# Patient Record
Sex: FEMALE | Race: BLACK OR AFRICAN AMERICAN | NOT HISPANIC OR LATINO | URBAN - METROPOLITAN AREA
[De-identification: names, ages, dates, MRNs, and addresses within clinical notes are randomized per-mention and may not be internally consistent; named-entity substitution may affect disease eponyms.]

---

## 2023-12-12 ENCOUNTER — OFFICE (OUTPATIENT)
Dept: URBAN - METROPOLITAN AREA CLINIC 67 | Facility: CLINIC | Age: 28
End: 2023-12-12
Payer: COMMERCIAL

## 2023-12-12 VITALS
SYSTOLIC BLOOD PRESSURE: 110 MMHG | WEIGHT: 124 LBS | HEART RATE: 105 BPM | HEIGHT: 68 IN | DIASTOLIC BLOOD PRESSURE: 60 MMHG

## 2023-12-12 DIAGNOSIS — K62.5 HEMORRHAGE OF ANUS AND RECTUM: ICD-10-CM

## 2023-12-12 DIAGNOSIS — K60.2 ANAL FISSURE, UNSPECIFIED: ICD-10-CM

## 2023-12-12 DIAGNOSIS — K62.89 OTHER SPECIFIED DISEASES OF ANUS AND RECTUM: ICD-10-CM

## 2023-12-12 PROCEDURE — 99204 OFFICE O/P NEW MOD 45 MIN: CPT | Performed by: INTERNAL MEDICINE

## 2023-12-12 NOTE — SERVICEHPINOTES
Melanie Dixon   is seen for an initial visit today.  
br
danni   She reports that she has a long history of constipation and for at least several months now has had rectal pain after bowel movements with the recent onset of rectal bleeding. 
br She was also recently referred to a Rheumatologist (by her hematologist) who diagnosed her with fibromyalgia and arthritis but the patient reports she also had abdominal x-rays done that raised the concern for IBD. 
br There is no known family history of IBD, CRC or polyps - she denies having any diarrhea or hematochezia and reports her weight has been within 5-lbs over the past few months.

## 2023-12-12 NOTE — SERVICENOTES
I will place her on a course of topical Diltiazem ointment and have recommended she also take OTC Miralax 1-2 times daily for her constipation.
I will get her records from her hematologist and rheumatologist and have her return in 4 weeks for follow-up.

## 2024-01-26 ENCOUNTER — OFFICE (OUTPATIENT)
Dept: URBAN - METROPOLITAN AREA CLINIC 67 | Facility: CLINIC | Age: 29
End: 2024-01-26
Payer: MEDICAID

## 2024-01-26 VITALS
SYSTOLIC BLOOD PRESSURE: 120 MMHG | WEIGHT: 121 LBS | HEIGHT: 68 IN | HEART RATE: 100 BPM | DIASTOLIC BLOOD PRESSURE: 62 MMHG

## 2024-01-26 DIAGNOSIS — K62.5 HEMORRHAGE OF ANUS AND RECTUM: ICD-10-CM

## 2024-01-26 DIAGNOSIS — K60.2 ANAL FISSURE, UNSPECIFIED: ICD-10-CM

## 2024-01-26 DIAGNOSIS — K62.89 OTHER SPECIFIED DISEASES OF ANUS AND RECTUM: ICD-10-CM

## 2024-01-26 PROCEDURE — 99213 OFFICE O/P EST LOW 20 MIN: CPT | Performed by: INTERNAL MEDICINE

## 2024-01-26 NOTE — SERVICENOTES
We will confirm that her Diltiazem ointment Rx is still at the compounding pharmacy and she will pick it up today. I also encouraged her to take the OTC Miralax once daily to ensure no recurrent constipation which is likely the cause of her fissure. 
I will have her return for follow-up in 6 weeks or sooner if needed.

## 2024-01-26 NOTE — SERVICEHPINOTES
Melanie Dixon   is seen today for a follow-up visit   regarding an anal fissure and constipation. At the time of her last appointment on 12/12/23, she reported a several month history of rectal pain after bowel movements along with the recent onset of rectal bleeding in the setting of chronic constipation.Mello was found to have a suspected non-bleeding fissure on exam and was placed on a course of topical Diltiazem ointment - I also recommended that she start daily OTC Miralax for her chronic constipation. There is no known family history of IBD, CRC or polypsToday, she reports that unfortunately she never picked up her Diltiazem ointment due to a very busy work schedule - she has been taking OTC Miralax as needed as her constipation seems to be improved but she will still have episodes of rectal pain with BMs. Recently, she had an episode of rectal bleeding (red blood on toilet tissue and in the toilet water) but she denies any hematochezia or abdominal discomfort and her weight has been stable. There is no known family history of IBD, CRC or polyps.